# Patient Record
Sex: FEMALE | Race: WHITE | NOT HISPANIC OR LATINO | Employment: FULL TIME | URBAN - METROPOLITAN AREA
[De-identification: names, ages, dates, MRNs, and addresses within clinical notes are randomized per-mention and may not be internally consistent; named-entity substitution may affect disease eponyms.]

---

## 2024-05-29 DIAGNOSIS — L70.0 ACNE COMEDONE: ICD-10-CM

## 2024-05-29 RX ORDER — SPIRONOLACTONE 25 MG/1
25 TABLET ORAL 2 TIMES DAILY
Qty: 60 TABLET | Refills: 0 | Status: SHIPPED | OUTPATIENT
Start: 2024-05-29

## 2024-05-30 ENCOUNTER — HOSPITAL ENCOUNTER (OUTPATIENT)
Dept: CT IMAGING | Facility: HOSPITAL | Age: 24
Discharge: HOME | End: 2024-05-30
Admitting: NURSE PRACTITIONER
Payer: COMMERCIAL

## 2024-05-30 DIAGNOSIS — R51.9 NEW ONSET OF HEADACHES: ICD-10-CM

## 2024-05-30 DIAGNOSIS — G43.719 INTRACTABLE CHRONIC MIGRAINE WITHOUT AURA AND WITHOUT STATUS MIGRAINOSUS: ICD-10-CM

## 2024-05-30 PROCEDURE — 25510000001 IOPAMIDOL PER 1 ML: Performed by: NURSE PRACTITIONER

## 2024-05-30 PROCEDURE — 70470 CT HEAD/BRAIN W/O & W/DYE: CPT

## 2024-05-30 RX ORDER — SUMATRIPTAN 50 MG/1
TABLET, FILM COATED ORAL
Qty: 9 TABLET | Refills: 0 | Status: SHIPPED | OUTPATIENT
Start: 2024-05-30

## 2024-05-30 RX ADMIN — IOPAMIDOL 125 ML: 755 INJECTION, SOLUTION INTRAVENOUS at 13:46

## 2024-06-05 DIAGNOSIS — G43.719 INTRACTABLE CHRONIC MIGRAINE WITHOUT AURA AND WITHOUT STATUS MIGRAINOSUS: ICD-10-CM

## 2024-06-05 DIAGNOSIS — L70.0 ACNE COMEDONE: ICD-10-CM

## 2024-06-05 RX ORDER — SPIRONOLACTONE 25 MG/1
25 TABLET ORAL 2 TIMES DAILY
Qty: 60 TABLET | Refills: 0 | Status: SHIPPED | OUTPATIENT
Start: 2024-06-05

## 2024-06-05 RX ORDER — TOPIRAMATE 25 MG/1
25 TABLET ORAL DAILY
Qty: 30 TABLET | Refills: 1 | Status: SHIPPED | OUTPATIENT
Start: 2024-06-05

## 2024-06-17 NOTE — PROGRESS NOTES
"Well Woman Visit    CC: Scheduled annual well gyn visit  Chief Complaint   Patient presents with    Gynecologic Exam       HPI:   23 y.o.   Social History     Substance and Sexual Activity   Sexual Activity Yes    Partners: Male    Birth control/protection: None     Menses:   q mo, lasts 5-6 days, changes products q 3hrs on heaviest days.   Pain with menses:  Mild, OTC meds control discomfort    Patient is 25 yo or younger and DESIRES GC/CT testing today    Pt has no complaints today.    History: PMHx, Meds, Allergies, PSHx, Social Hx, and POBHx all reviewed and updated.    PHYSICAL EXAM:  /74   Pulse 82   Ht 157.5 cm (62\")   Wt 65.8 kg (145 lb)   LMP 2024   Breastfeeding No   BMI 26.52 kg/m²  Not found.   General- NAD, alert and oriented, appropriate  Psych- Normal mood, good memory  Neck- No masses, no thyroid enlargement  CV- Regular rhythm, no murmurs  Resp- CTA to bases, no wheezes  Abdomen- Soft, non distended, non tender, no masses    Chaperone present during breast and/or pelvic exam if performed.  Breast left-  Bilaterally symmetrical, no masses, non tender, no nipple discharge, no axillary or supraclavicular nodes palpable.    Breast right- Bilaterally symmetrical, no masses, non tender, no nipple discharge, no axillary or supraclavicular nodes palpable.      External genitalia- Normal female, no lesions  Urethra/meatus- Normal, no masses, non tender  Bladder- Normal, no masses, non tender  Vagina- Normal, no atrophy, no lesions, no discharge.    Prolapse : none noted, not examined with split speculum to delineate  Cvx- Normal, no lesions, no discharge, No cervical motion tenderness  Uterus- Normal size, shape & consistency.  Non tender, mobile.  Adnexa- No mass, non tender  Anus/Rectum/Perineum- Not performed    Lymphatic- No palpable neck, axillary, or groin nodes  Ext- No edema, no cyanosis    Skin- No lesions, no rashes, no acanthosis nigricans      ASSESSMENT and PLAN:  "   Diagnoses and all orders for this visit:    1. Well woman exam (Primary)  -     PAP, Liquid Based ([facility] Only)    2. Birth control counseling  Comments:  declines    3. Screen for STD (sexually transmitted disease)  -     Chlamydia trachomatis, Neisseria gonorrhoeae, PCR - Swab, Cervix        Preventative:  BREAST HEALTH- Monthly self breast exam importance and how to reviewed. MMG and/or MRI (prn) reviewed per society guidelines and her individual history. Screen: Not medically needed  CERVICAL CANCER Screening- Reviewed current ASCCP guidelines for screening w and wo cotest HPV, age specific.  Screen: Updated today  COLON CANCER Screening- Reviewed current medical society guidelines and options.  Screen:  Not medically needed  SEXUAL HEALTH: STD screening desired.  Ordered  Importance of WEIGHT MANAGEMENT, nutrition, and exercise reviewed.  Ideal BMI discussed  BONE HEALTH- Reviewed current medical society guidelines and options for testing, calcium and vit D intake.  Weight bearing exercise.  DEXA: Not medically needed  VACCINATIONS Recommended: Covid vaccine, Flu annually.  Importance discussed, risk being unvaccinated reviewed.  Questions answered  Smoking status- NON SMOKER/VAPER  Follow up PCP/Specialist PMHx and/or Labs          She understands the importance of having any ordered tests to be performed in a timely fashion.  The risks of not performing them include, but are not limited to, advanced cancer stages, bone loss from osteoporosis and/or subsequent increase in morbidity and/or mortality.  She is encouraged to review her results online and/or contact or office if she has questions.     Follow Up:  Return in about 1 year (around 6/21/2025) for WWE.            Anabel Blakely, DO  06/21/2024    [facility]  [facility] [place] [address]  Dept: [phone]  Dept Fax: [phone]  Loc: [phone]  Loc Fax: [phone]

## 2024-06-21 ENCOUNTER — OFFICE VISIT (OUTPATIENT)
Dept: OBSTETRICS AND GYNECOLOGY | Facility: CLINIC | Age: 24
End: 2024-06-21
Payer: COMMERCIAL

## 2024-06-21 VITALS
HEIGHT: 62 IN | SYSTOLIC BLOOD PRESSURE: 127 MMHG | HEART RATE: 82 BPM | BODY MASS INDEX: 26.68 KG/M2 | DIASTOLIC BLOOD PRESSURE: 74 MMHG | WEIGHT: 145 LBS

## 2024-06-21 DIAGNOSIS — Z30.09 BIRTH CONTROL COUNSELING: ICD-10-CM

## 2024-06-21 DIAGNOSIS — Z01.419 WELL WOMAN EXAM: Primary | ICD-10-CM

## 2024-06-21 DIAGNOSIS — Z11.3 SCREEN FOR STD (SEXUALLY TRANSMITTED DISEASE): ICD-10-CM

## 2024-06-21 LAB
C TRACH RRNA CVX QL NAA+PROBE: NOT DETECTED
N GONORRHOEA RRNA SPEC QL NAA+PROBE: NOT DETECTED

## 2024-06-21 PROCEDURE — 87491 CHLMYD TRACH DNA AMP PROBE: CPT | Performed by: OBSTETRICS & GYNECOLOGY

## 2024-06-21 PROCEDURE — 87591 N.GONORRHOEAE DNA AMP PROB: CPT | Performed by: OBSTETRICS & GYNECOLOGY

## 2024-06-21 PROCEDURE — G0123 SCREEN CERV/VAG THIN LAYER: HCPCS | Performed by: OBSTETRICS & GYNECOLOGY

## 2024-06-25 LAB
CYTOLOGIST CVX/VAG CYTO: NORMAL
CYTOLOGY CVX/VAG DOC CYTO: NORMAL
DX ICD CODE: NORMAL
Lab: NORMAL
OTHER STN SPEC: NORMAL
STAT OF ADQ CVX/VAG CYTO-IMP: NORMAL

## 2024-08-22 ENCOUNTER — OFFICE VISIT (OUTPATIENT)
Dept: FAMILY MEDICINE CLINIC | Facility: CLINIC | Age: 24
End: 2024-08-22
Payer: COMMERCIAL

## 2024-08-22 VITALS
WEIGHT: 143.8 LBS | DIASTOLIC BLOOD PRESSURE: 62 MMHG | HEIGHT: 62 IN | SYSTOLIC BLOOD PRESSURE: 106 MMHG | HEART RATE: 73 BPM | BODY MASS INDEX: 26.46 KG/M2

## 2024-08-22 DIAGNOSIS — L70.0 ACNE COMEDONE: ICD-10-CM

## 2024-08-22 DIAGNOSIS — G43.719 INTRACTABLE CHRONIC MIGRAINE WITHOUT AURA AND WITHOUT STATUS MIGRAINOSUS: Primary | ICD-10-CM

## 2024-08-22 PROCEDURE — 99214 OFFICE O/P EST MOD 30 MIN: CPT

## 2024-08-22 RX ORDER — RIMEGEPANT SULFATE 75 MG/75MG
75 TABLET, ORALLY DISINTEGRATING ORAL DAILY
Qty: 2 TABLET | Refills: 0 | COMMUNITY
Start: 2024-08-22

## 2024-08-22 RX ORDER — SPIRONOLACTONE 25 MG/1
25 TABLET ORAL 2 TIMES DAILY
Qty: 180 TABLET | Refills: 1 | Status: SHIPPED | OUTPATIENT
Start: 2024-08-22

## 2024-08-22 RX ORDER — RIMEGEPANT SULFATE 75 MG/75MG
75 TABLET, ORALLY DISINTEGRATING ORAL DAILY
Qty: 30 TABLET | Refills: 0 | Status: SHIPPED | OUTPATIENT
Start: 2024-08-22

## 2024-08-22 NOTE — PROGRESS NOTES
"Chief Complaint  Migraine and Acne    SUBJECTIVE  Ralph Arriaga presents to [facility]    History of Present Illness  Patient is a 24-year-old male presents today for 6-month follow-up on acne and migraines.    Acne-patient currently spironolactone.  Patient has experienced great improvement in her acne since starting this.    Migraines/patient was started on Topamax insurance denied Qulipta needed her to try this first.  Patient is also been on Imitrex.  Patient reports Topamax and Imitrex did not provide any relief for migraines.  Patient reports she is having 2 migraines a day.    She is due to update her lipid panel.     No other concerns today.     Past Medical History:   Diagnosis Date   • H/O cold sores    • Migraine withOUT aura       Family History   Problem Relation Age of Onset   • Diabetes Paternal Grandfather    • Breast cancer Neg Hx    • Ovarian cancer Neg Hx    • Uterine cancer Neg Hx    • Cervical cancer Neg Hx    • Colon cancer Neg Hx    • Stomach cancer Neg Hx    • Skin cancer Neg Hx    • Malig Hyperthermia Neg Hx    • Clotting disorder Neg Hx    • Pulmonary embolism Neg Hx    • Coronary artery disease Neg Hx       Past Surgical History:   Procedure Laterality Date   • EAR TUBES     • WISDOM TOOTH EXTRACTION          Current Outpatient Medications:   •  spironolactone (Aldactone) 25 MG tablet, Take 1 tablet by mouth 2 (Two) Times a Day., Disp: 180 tablet, Rfl: 1  •  Rimegepant Sulfate (Nurtec) 75 MG tablet dispersible tablet, Take 1 tablet by mouth Daily., Disp: 30 tablet, Rfl: 0  •  Rimegepant Sulfate (Nurtec) 75 MG tablet dispersible tablet, Take 1 tablet by mouth Daily. Lot [id number] exp 05/26, Disp: 2 tablet, Rfl: 0    OBJECTIVE  Vital Signs:   /62   Pulse 73   Ht 157.5 cm (62.01\")   Wt 65.2 kg (143 lb 12.8 oz)   BMI 26.29 kg/m²    Estimated body mass index is 26.29 kg/m² as calculated from the following:    Height as of this encounter: 157.5 cm " "(62.01\").    Weight as of this encounter: 65.2 kg (143 lb 12.8 oz).     Wt Readings from Last 3 Encounters:   08/22/24 65.2 kg (143 lb 12.8 oz)   06/21/24 65.8 kg (145 lb)   04/18/24 65.9 kg (145 lb 3.2 oz)     BP Readings from Last 3 Encounters:   08/22/24 106/62   06/21/24 127/74   04/18/24 118/67       Physical Exam  Vitals reviewed.   Constitutional:       General: She is not in acute distress.     Appearance: She is not ill-appearing.   HENT:      Head: Normocephalic and atraumatic.   Eyes:      Conjunctiva/sclera: Conjunctivae normal.   Cardiovascular:      Rate and Rhythm: Normal rate and regular rhythm.      Heart sounds: Normal heart sounds.   Pulmonary:      Effort: Pulmonary effort is normal.      Breath sounds: Normal breath sounds.   Abdominal:      General: Abdomen is flat. Bowel sounds are normal. There is no distension.      Palpations: Abdomen is soft.      Tenderness: There is no abdominal tenderness.   Skin:     General: Skin is warm and dry.   Neurological:      Mental Status: She is alert and oriented to person, place, and time.   Psychiatric:         Mood and Affect: Mood normal.         Behavior: Behavior normal.         Thought Content: Thought content normal.         Judgment: Judgment normal.        Result Review    Common labs          3/29/2024    09:47   Common Labs   Glucose 96    BUN 11    Creatinine 0.88    Sodium 138    Potassium 4.1    Chloride 104    Calcium 10.8    Albumin 4.4    Total Bilirubin 0.4    Alkaline Phosphatase 83    AST (SGOT) 19    ALT (SGPT) 19    WBC 6.69    Hemoglobin 15.4    Hematocrit 46.3    Platelets 290    Total Cholesterol 232    Triglycerides 125    HDL Cholesterol 63    LDL Cholesterol  147        CT Head With & Without Contrast    Result Date: 5/31/2024  Impression: 1.  An acute intracranial abnormality is not appreciated.    Electronically Signed By-Paras Leal MD On:5/31/2024 10:30 AM          The above data has been reviewed by JUDY Castro " 08/22/2024 15:10 EDT.          Patient Care Team:  Denisse Lemus APRN as PCP - General (Nurse Practitioner)  Carmine Dolan MD as Consulting Physician (Obstetrics and Gynecology)            ASSESSMENT & PLAN    Diagnoses and all orders for this visit:    1. Intractable chronic migraine without aura and without status migrainosus (Primary)  Comments:  start nurtec daily for prevention  Orders:  -     Rimegepant Sulfate (Nurtec) 75 MG tablet dispersible tablet; Take 1 tablet by mouth Daily.  Dispense: 30 tablet; Refill: 0  -     Rimegepant Sulfate (Nurtec) 75 MG tablet dispersible tablet; Take 1 tablet by mouth Daily. Lot [id number] exp 05/26  Dispense: 2 tablet; Refill: 0    2. Acne comedone  Comments:  improving on spironolactone,continue  Orders:  -     spironolactone (Aldactone) 25 MG tablet; Take 1 tablet by mouth 2 (Two) Times a Day.  Dispense: 180 tablet; Refill: 1         Tobacco Use: Low Risk  (8/22/2024)    Patient History    • Smoking Tobacco Use: Never    • Smokeless Tobacco Use: Never    • Passive Exposure: Never       Follow Up     Return in about 1 month (around 9/22/2024) for migraines.      Patient was given instructions and counseling regarding her condition or for health maintenance advice. Please see specific information pulled into the AVS if appropriate.   I have reviewed information obtained and documented by others and I have confirmed the accuracy of this documented note.    JUDY Castro

## 2024-11-22 ENCOUNTER — OFFICE VISIT (OUTPATIENT)
Dept: FAMILY MEDICINE CLINIC | Facility: CLINIC | Age: 24
End: 2024-11-22
Payer: COMMERCIAL

## 2024-11-22 ENCOUNTER — LAB (OUTPATIENT)
Dept: LAB | Facility: HOSPITAL | Age: 24
End: 2024-11-22
Payer: COMMERCIAL

## 2024-11-22 VITALS
HEIGHT: 62 IN | WEIGHT: 148.4 LBS | OXYGEN SATURATION: 100 % | SYSTOLIC BLOOD PRESSURE: 115 MMHG | HEART RATE: 80 BPM | BODY MASS INDEX: 27.31 KG/M2 | DIASTOLIC BLOOD PRESSURE: 67 MMHG

## 2024-11-22 DIAGNOSIS — R45.86 MOOD SWINGS: ICD-10-CM

## 2024-11-22 DIAGNOSIS — R45.4 IRRITABILITY AND ANGER: Primary | ICD-10-CM

## 2024-11-22 DIAGNOSIS — E83.52 HYPERCALCEMIA: ICD-10-CM

## 2024-11-22 DIAGNOSIS — E78.2 MODERATE MIXED HYPERLIPIDEMIA NOT REQUIRING STATIN THERAPY: ICD-10-CM

## 2024-11-22 DIAGNOSIS — G43.719 INTRACTABLE CHRONIC MIGRAINE WITHOUT AURA AND WITHOUT STATUS MIGRAINOSUS: ICD-10-CM

## 2024-11-22 LAB
AMPHET+METHAMPHET UR QL: NEGATIVE
AMPHETAMINE INTERNAL CONTROL: NORMAL
AMPHETAMINES UR QL: NEGATIVE
BARBITURATE INTERNAL CONTROL: NORMAL
BARBITURATES UR QL SCN: NEGATIVE
BENZODIAZ UR QL SCN: NEGATIVE
BENZODIAZEPINE INTERNAL CONTROL: NORMAL
BUPRENORPHINE INTERNAL CONTROL: NORMAL
BUPRENORPHINE SERPL-MCNC: NEGATIVE NG/ML
CA-I SERPL ISE-MCNC: 1.55 MMOL/L (ref 1.15–1.35)
CANNABINOIDS SERPL QL: NEGATIVE
CHOLEST SERPL-MCNC: 240 MG/DL (ref 0–200)
COCAINE INTERNAL CONTROL: NORMAL
COCAINE UR QL: NEGATIVE
EXPIRATION DATE: NORMAL
HDLC SERPL-MCNC: 58 MG/DL (ref 40–60)
LDLC SERPL CALC-MCNC: 162 MG/DL (ref 0–100)
LDLC/HDLC SERPL: 2.74 {RATIO}
Lab: NORMAL
MDMA (ECSTASY) INTERNAL CONTROL: NORMAL
MDMA UR QL SCN: NEGATIVE
METHADONE INTERNAL CONTROL: NORMAL
METHADONE UR QL SCN: NEGATIVE
METHAMPHETAMINE INTERNAL CONTROL: NORMAL
MORPHINE INTERNAL CONTROL: NORMAL
MORPHINE/OPIATES SCREEN, URINE: NEGATIVE
OXYCODONE INTERNAL CONTROL: NORMAL
OXYCODONE UR QL SCN: NEGATIVE
PCP UR QL SCN: NEGATIVE
PHENCYCLIDINE INTERNAL CONTROL: NORMAL
PTH-INTACT SERPL-MCNC: 25.3 PG/ML (ref 15–65)
THC INTERNAL CONTROL: NORMAL
TRIGL SERPL-MCNC: 115 MG/DL (ref 0–150)
VLDLC SERPL-MCNC: 20 MG/DL (ref 5–40)

## 2024-11-22 PROCEDURE — 83970 ASSAY OF PARATHORMONE: CPT

## 2024-11-22 PROCEDURE — 99214 OFFICE O/P EST MOD 30 MIN: CPT

## 2024-11-22 PROCEDURE — 80305 DRUG TEST PRSMV DIR OPT OBS: CPT

## 2024-11-22 PROCEDURE — 36415 COLL VENOUS BLD VENIPUNCTURE: CPT

## 2024-11-22 PROCEDURE — 80061 LIPID PANEL: CPT

## 2024-11-22 PROCEDURE — 82330 ASSAY OF CALCIUM: CPT

## 2024-11-22 RX ORDER — BUPROPION HYDROCHLORIDE 150 MG/1
150 TABLET ORAL DAILY
Qty: 30 TABLET | Refills: 1 | Status: SHIPPED | OUTPATIENT
Start: 2024-11-22

## 2024-11-22 RX ORDER — BUTALBITAL, ACETAMINOPHEN AND CAFFEINE 300; 40; 50 MG/1; MG/1; MG/1
1 CAPSULE ORAL EVERY 6 HOURS PRN
Qty: 30 CAPSULE | Refills: 0 | Status: SHIPPED | OUTPATIENT
Start: 2024-11-22

## 2024-11-22 NOTE — PROGRESS NOTES
Chief Complaint  Headache and mood swings    SUBJECTIVE  Ralph Arriaga presents to [facility] following up on headaches and to discuss mood swings    History of Present Illness  Headaches/migraines-patient reports she still having today.  Patient has been on Topamax, Imitrex, Nurtec, Qulipta in the past with no relief.  Patient is taking ibuprofen twice daily.  Mother accompanies her today and reports that only thing that worked for her was Fioricet and is adamant that we trial this.  Patient reports sometimes unilateral sometimes bilateral.  She does have sensitivity to light and sound.  Denies any aura.    Patient reports since having her daughter she has noticed mood swings irritability and anger.  Patient reports 1 minute she will be fine in the next she is having an episode of rage.  Mother reports she had issues with this in the past as well and after parathyroid removal improved.  Patient has had elevated calcium in the past.  Patient still has pending labs from May to repeat calcium,PTH and lipid panel. She would like to discuss medication options as this has started to effect her daily life. She was fired from previous job due to cussing at her boss. She is worried about possibility of weight gain and sexual dysfunction with medication.     Past Medical History:   Diagnosis Date    H/O cold sores     Migraine withOUT aura       Family History   Problem Relation Age of Onset    Diabetes Paternal Grandfather     Breast cancer Neg Hx     Ovarian cancer Neg Hx     Uterine cancer Neg Hx     Cervical cancer Neg Hx     Colon cancer Neg Hx     Stomach cancer Neg Hx     Skin cancer Neg Hx     Malig Hyperthermia Neg Hx     Clotting disorder Neg Hx     Pulmonary embolism Neg Hx     Coronary artery disease Neg Hx       Past Surgical History:   Procedure Laterality Date    EAR TUBES      WISDOM TOOTH EXTRACTION          Current Outpatient Medications:     spironolactone  "(Aldactone) 25 MG tablet, Take 1 tablet by mouth 2 (Two) Times a Day., Disp: 180 tablet, Rfl: 1    buPROPion XL (Wellbutrin XL) 150 MG 24 hr tablet, Take 1 tablet by mouth Daily., Disp: 30 tablet, Rfl: 1    butalbital-acetaminophen-caffeine (ORBIVAN) -40 MG capsule capsule, Take 1 capsule by mouth Every 6 (Six) Hours As Needed (migraine)., Disp: 30 capsule, Rfl: 0    OBJECTIVE  Vital Signs:   /67   Pulse 80   Ht 157.5 cm (62.01\")   Wt 67.3 kg (148 lb 6.4 oz)   SpO2 100%   BMI 27.13 kg/m²    Estimated body mass index is 27.13 kg/m² as calculated from the following:    Height as of this encounter: 157.5 cm (62.01\").    Weight as of this encounter: 67.3 kg (148 lb 6.4 oz).     Wt Readings from Last 3 Encounters:   11/22/24 67.3 kg (148 lb 6.4 oz)   08/22/24 65.2 kg (143 lb 12.8 oz)   06/21/24 65.8 kg (145 lb)     BP Readings from Last 3 Encounters:   11/22/24 115/67   08/22/24 106/62   06/21/24 127/74       Physical Exam  Vitals reviewed.   Constitutional:       General: She is not in acute distress.     Appearance: She is not ill-appearing.   HENT:      Head: Normocephalic and atraumatic.   Eyes:      Extraocular Movements: Extraocular movements intact.      Conjunctiva/sclera: Conjunctivae normal.      Pupils: Pupils are equal, round, and reactive to light.   Neck:      Thyroid: No thyroid mass, thyromegaly or thyroid tenderness.   Cardiovascular:      Rate and Rhythm: Normal rate and regular rhythm.      Heart sounds: Normal heart sounds.   Pulmonary:      Effort: Pulmonary effort is normal.      Breath sounds: Normal breath sounds.   Musculoskeletal:      Cervical back: Normal range of motion and neck supple.   Neurological:      General: No focal deficit present.      Mental Status: She is alert and oriented to person, place, and time.   Psychiatric:         Mood and Affect: Mood normal.         Behavior: Behavior normal.         Thought Content: Thought content normal.         Judgment: Judgment " normal.          Result Review    Common labs          3/29/2024    09:47   Common Labs   Glucose 96    BUN 11    Creatinine 0.88    Sodium 138    Potassium 4.1    Chloride 104    Calcium 10.8    Albumin 4.4    Total Bilirubin 0.4    Alkaline Phosphatase 83    AST (SGOT) 19    ALT (SGPT) 19    WBC 6.69    Hemoglobin 15.4    Hematocrit 46.3    Platelets 290    Total Cholesterol 232    Triglycerides 125    HDL Cholesterol 63    LDL Cholesterol  147        No Images in the past 120 days found..      The above data has been reviewed by JUDY Castro 11/22/2024 13:09 EST.          Patient Care Team:  Denisse Lemus APRN as PCP - General (Nurse Practitioner)  Carmine Dolan MD as Consulting Physician (Obstetrics and Gynecology)            ASSESSMENT & PLAN    Diagnoses and all orders for this visit:    1. Irritability and anger (Primary)  Comments:  trial 1month of wellbutrin 150 mg XL, if no better will stop and try Prozac  Orders:  -     buPROPion XL (Wellbutrin XL) 150 MG 24 hr tablet; Take 1 tablet by mouth Daily.  Dispense: 30 tablet; Refill: 1    2. Mood swings  Comments:  trial wellbutrin, 1 month follow up  Orders:  -     buPROPion XL (Wellbutrin XL) 150 MG 24 hr tablet; Take 1 tablet by mouth Daily.  Dispense: 30 tablet; Refill: 1    3. Intractable chronic migraine without aura and without status migrainosus  Comments:  trial celena UDS and CC done in office today, warned of overuse and rebound headaches  Orders:  -     POC 12 Panel Urine Drug Screen  -     butalbital-acetaminophen-caffeine (ORBIVAN) -40 MG capsule capsule; Take 1 capsule by mouth Every 6 (Six) Hours As Needed (migraine).  Dispense: 30 capsule; Refill: 0         Tobacco Use: Low Risk  (11/22/2024)    Patient History     Smoking Tobacco Use: Never     Smokeless Tobacco Use: Never     Passive Exposure: Never       Follow Up     Return in about 1 month (around 12/22/2024) for Next scheduled follow up.      Patient was given  instructions and counseling regarding her condition or for health maintenance advice. Please see specific information pulled into the AVS if appropriate.   I have reviewed information obtained and documented by others and I have confirmed the accuracy of this documented note.    JUDY Castro

## 2024-11-25 ENCOUNTER — TELEPHONE (OUTPATIENT)
Dept: FAMILY MEDICINE CLINIC | Facility: CLINIC | Age: 24
End: 2024-11-25

## 2024-11-25 NOTE — TELEPHONE ENCOUNTER
Caller: Ralph Arriaga    Relationship to patient: Self    Best call back number: [phone]    Patient is needing: PATIENT CALLED STATING [facility] HAS INFORMED HER THEY DID NOT RECEIVE THE PRESCRIPTION ORBIVAN THAT WAS SENT ON 11.22.24. PATIENT STATES SHE WAS TOLD BY THE PHARMACY TO REACH OUT TO HER PCP AND ASK IF THIS CAN BE RESENT. PATIENT WOULD LIKE A CALLBACK ONCE THIS HAS BEEN COMPLETED.

## 2024-11-27 ENCOUNTER — TELEPHONE (OUTPATIENT)
Dept: FAMILY MEDICINE CLINIC | Facility: CLINIC | Age: 24
End: 2024-11-27
Payer: COMMERCIAL

## 2024-11-27 DIAGNOSIS — E83.52 HYPERCALCEMIA: Primary | ICD-10-CM

## 2024-11-27 NOTE — TELEPHONE ENCOUNTER
[facility] CALLED OFFICE REQUESTING TO SPEAK AN MA REGARDING RESULTS. CALL BACK NUMBER [phone].

## 2024-12-19 DIAGNOSIS — R45.86 MOOD SWINGS: ICD-10-CM

## 2024-12-19 DIAGNOSIS — R45.4 IRRITABILITY AND ANGER: ICD-10-CM

## 2024-12-19 RX ORDER — BUPROPION HYDROCHLORIDE 150 MG/1
150 TABLET ORAL DAILY
Qty: 30 TABLET | Refills: 0 | Status: SHIPPED | OUTPATIENT
Start: 2024-12-19

## 2025-04-04 DIAGNOSIS — L70.0 ACNE COMEDONE: ICD-10-CM

## 2025-04-04 RX ORDER — SPIRONOLACTONE 25 MG/1
25 TABLET ORAL 2 TIMES DAILY
Qty: 80 TABLET | Refills: 0 | Status: SHIPPED | OUTPATIENT
Start: 2025-04-04

## 2025-04-17 ENCOUNTER — OFFICE VISIT (OUTPATIENT)
Dept: FAMILY MEDICINE CLINIC | Facility: CLINIC | Age: 25
End: 2025-04-17
Payer: COMMERCIAL

## 2025-04-17 VITALS
OXYGEN SATURATION: 99 % | HEIGHT: 62 IN | BODY MASS INDEX: 27.6 KG/M2 | SYSTOLIC BLOOD PRESSURE: 112 MMHG | HEART RATE: 67 BPM | WEIGHT: 150 LBS | DIASTOLIC BLOOD PRESSURE: 67 MMHG

## 2025-04-17 DIAGNOSIS — E66.3 OVERWEIGHT (BMI 25.0-29.9): ICD-10-CM

## 2025-04-17 DIAGNOSIS — Z00.00 ANNUAL PHYSICAL EXAM: Primary | ICD-10-CM

## 2025-04-17 DIAGNOSIS — R94.31 ABNORMAL EKG: ICD-10-CM

## 2025-04-17 DIAGNOSIS — Z79.899 HIGH RISK MEDICATION USE: ICD-10-CM

## 2025-04-17 DIAGNOSIS — Z13.29 SCREENING FOR THYROID DISORDER: ICD-10-CM

## 2025-04-17 DIAGNOSIS — Z13.220 SCREENING FOR LIPID DISORDERS: ICD-10-CM

## 2025-04-17 LAB
AMPHET+METHAMPHET UR QL: NEGATIVE
AMPHETAMINE INTERNAL CONTROL: NORMAL
AMPHETAMINES UR QL: NEGATIVE
BARBITURATE INTERNAL CONTROL: NORMAL
BARBITURATES UR QL SCN: NEGATIVE
BENZODIAZ UR QL SCN: NEGATIVE
BENZODIAZEPINE INTERNAL CONTROL: NORMAL
BUPRENORPHINE INTERNAL CONTROL: NORMAL
BUPRENORPHINE SERPL-MCNC: NEGATIVE NG/ML
CANNABINOIDS SERPL QL: NEGATIVE
COCAINE INTERNAL CONTROL: NORMAL
COCAINE UR QL: NEGATIVE
EXPIRATION DATE: NORMAL
Lab: NORMAL
MDMA (ECSTASY) INTERNAL CONTROL: NORMAL
MDMA UR QL SCN: NEGATIVE
METHADONE INTERNAL CONTROL: NORMAL
METHADONE UR QL SCN: NEGATIVE
METHAMPHETAMINE INTERNAL CONTROL: NORMAL
MORPHINE INTERNAL CONTROL: NORMAL
MORPHINE/OPIATES SCREEN, URINE: NEGATIVE
OXYCODONE INTERNAL CONTROL: NORMAL
OXYCODONE UR QL SCN: NEGATIVE
PCP UR QL SCN: NEGATIVE
PHENCYCLIDINE INTERNAL CONTROL: NORMAL
THC INTERNAL CONTROL: NORMAL

## 2025-04-17 RX ORDER — NAPROXEN 250 MG/1
250 TABLET ORAL 2 TIMES DAILY PRN
COMMUNITY

## 2025-04-17 RX ORDER — PHENTERMINE HYDROCHLORIDE 37.5 MG/1
37.5 CAPSULE ORAL EVERY MORNING
Qty: 30 CAPSULE | Refills: 0 | Status: CANCELLED | OUTPATIENT
Start: 2025-04-17

## 2025-04-17 NOTE — PROGRESS NOTES
Chief Complaint  Annual Exam    SUBJECTIVE  Ralph Arriaga presents to [facility]    History of Present Illness  Patient is a 24-year-old female who presents today for her annual physical exam, to be done in office today.  Needs chronic condition management of acne and migraines.    PAP- 6/21/24    Acne- Pt is currently on spironolactone 25 MG BID. Pt states that she is doing well on the medication. Has noticed marked improvement in her breakouts.     Migraines- Pt states that she has started taking naproxen sodium for onset migraines. She said that she thinks that she is taking about 1-2 a wk.    Stopped Wellbutrin for mood swings/irritability but state she feels great now, mood is stable.     She is concerned about her weight and wishes to start phentermine if she is able. She has been going to the gym and eating in calorie deficit and is gaining weight.     Patient is due labs. Orders placed at today's visit. Discussed with patient that these are fasting labs.     No other concerns or complaints at this time.  Patient denies any diarrhea, constipation, blood in stool, urinary urgency, frequency, or dysuria, chest pain, shortness of breath or syncope.             Past Medical History:   Diagnosis Date    H/O cold sores     Migraine withOUT aura       Family History   Problem Relation Age of Onset    Diabetes Paternal Grandfather     Breast cancer Neg Hx     Ovarian cancer Neg Hx     Uterine cancer Neg Hx     Cervical cancer Neg Hx     Colon cancer Neg Hx     Stomach cancer Neg Hx     Skin cancer Neg Hx     Malig Hyperthermia Neg Hx     Clotting disorder Neg Hx     Pulmonary embolism Neg Hx     Coronary artery disease Neg Hx       Past Surgical History:   Procedure Laterality Date    EAR TUBES      WISDOM TOOTH EXTRACTION          Current Outpatient Medications:     naproxen (NAPROSYN) 250 MG tablet, Take 1 tablet by mouth 2 (Two) Times a Day As Needed for Headache. Taking  "for onset of migraine, Disp: , Rfl:     spironolactone (Aldactone) 25 MG tablet, Take 1 tablet by mouth 2 (Two) Times a Day., Disp: 80 tablet, Rfl: 0    OBJECTIVE  Vital Signs:   /67 (BP Location: Left arm, Patient Position: Sitting, Cuff Size: Adult)   Pulse 67   Ht 157.5 cm (62\")   Wt 68 kg (150 lb)   LMP 04/11/2025   SpO2 99%   BMI 27.44 kg/m²    Estimated body mass index is 27.44 kg/m² as calculated from the following:    Height as of this encounter: 157.5 cm (62\").    Weight as of this encounter: 68 kg (150 lb).     Wt Readings from Last 3 Encounters:   04/17/25 68 kg (150 lb)   11/22/24 67.3 kg (148 lb 6.4 oz)   08/22/24 65.2 kg (143 lb 12.8 oz)     BP Readings from Last 3 Encounters:   04/17/25 112/67   11/22/24 115/67   08/22/24 106/62       Physical Exam  Vitals reviewed.   Constitutional:       General: She is awake. She is not in acute distress.     Appearance: She is well-groomed and overweight. She is not ill-appearing.   HENT:      Head: Normocephalic and atraumatic.      Right Ear: Tympanic membrane, ear canal and external ear normal.      Left Ear: Tympanic membrane, ear canal and external ear normal.      Nose: Nose normal.      Mouth/Throat:      Mouth: Mucous membranes are moist.      Pharynx: Oropharynx is clear. No oropharyngeal exudate or posterior oropharyngeal erythema.   Eyes:      Extraocular Movements: Extraocular movements intact.      Conjunctiva/sclera: Conjunctivae normal.      Pupils: Pupils are equal, round, and reactive to light.   Neck:      Thyroid: No thyroid mass, thyromegaly or thyroid tenderness.   Cardiovascular:      Rate and Rhythm: Normal rate and regular rhythm.      Heart sounds: Normal heart sounds.   Pulmonary:      Effort: Pulmonary effort is normal.      Breath sounds: Normal breath sounds.   Abdominal:      General: Abdomen is flat. Bowel sounds are normal. There is no distension.      Palpations: Abdomen is soft.      Tenderness: There is no abdominal " tenderness.   Musculoskeletal:         General: Normal range of motion.      Cervical back: Normal range of motion and neck supple. No rigidity or tenderness.   Lymphadenopathy:      Cervical: No cervical adenopathy.   Skin:     General: Skin is warm and dry.   Neurological:      Mental Status: She is alert and oriented to person, place, and time.   Psychiatric:         Mood and Affect: Mood normal.         Behavior: Behavior normal. Behavior is cooperative.         Thought Content: Thought content normal.         Judgment: Judgment normal.          Result Review    Common labs          11/22/2024    13:58   Common Labs   Total Cholesterol 240    Triglycerides 115    HDL Cholesterol 58    LDL Cholesterol  162        No Images in the past 120 days found..      The above data has been reviewed by JUDY Castro 04/17/2025 15:40 EDT.      ECG 12 Lead    Date/Time: 4/17/2025 4:27 PM  Performed by: Denisse Lemus APRN    Authorized by: Denisse Lemus APRN  Previous ECG: no previous ECG available  Rhythm: sinus rhythm  Rate: normal  T Waves: T waves normal  QRS axis: normal  Other findings: non-specific ST-T wave changes    Clinical impression: abnormal EKG           Patient Care Team:  Denisse Lemus APRN as PCP - General (Nurse Practitioner)  Carmine Dolan MD as Consulting Physician (Obstetrics and Gynecology)            ASSESSMENT & PLAN    Diagnoses and all orders for this visit:    1. Annual physical exam (Primary)  Comments:  Preventative counseling  Healthy diet  Daily exercise  Get adequate sleep  Orders:  -     Comprehensive Metabolic Panel; Future  -     CBC & Differential; Future  -     Lipid Panel; Future  -     TSH+Free T4; Future    2. Screening for thyroid disorder  -     TSH+Free T4; Future    3. Screening for lipid disorders  -     Lipid Panel; Future    4. High risk medication use  Comments:  UDS and drug contract signed in office today, JUDEP/Michael reviewed and appropriate  Orders:  -      Cancel: ECG 12 Lead  -     POC 12 Panel Urine Drug Screen  -     ECG 12 Lead    5. Overweight (BMI 25.0-29.9)  Comments:  UDS normal. CC signed, due to abnormal EKG will withhold at this time.  Orders:  -     POC 12 Panel Urine Drug Screen    6. Abnormal EKG  Comments:  benign vs cardiac vs electorlyte imbalance vs machine error, assess labs for electrolyte imbalance then will determine need for further evaluation.         Tobacco Use: Low Risk  (4/17/2025)    Patient History     Smoking Tobacco Use: Never     Smokeless Tobacco Use: Never     Passive Exposure: Never       Follow Up     Return in about 6 months (around 10/17/2025), or if symptoms worsen or fail to improve, for Next scheduled follow up.      Patient was given instructions and counseling regarding her condition or for health maintenance advice. Please see specific information pulled into the AVS if appropriate.   I have reviewed information obtained and documented by others and I have confirmed the accuracy of this documented note.    JUDY Castro

## 2025-05-16 ENCOUNTER — OFFICE VISIT (OUTPATIENT)
Dept: FAMILY MEDICINE CLINIC | Facility: CLINIC | Age: 25
End: 2025-05-16
Payer: COMMERCIAL

## 2025-05-16 VITALS
OXYGEN SATURATION: 97 % | DIASTOLIC BLOOD PRESSURE: 64 MMHG | SYSTOLIC BLOOD PRESSURE: 113 MMHG | HEART RATE: 77 BPM | WEIGHT: 148.8 LBS | HEIGHT: 62 IN | BODY MASS INDEX: 27.38 KG/M2

## 2025-05-16 DIAGNOSIS — J06.9 VIRAL URI WITH COUGH: Primary | ICD-10-CM

## 2025-05-16 PROCEDURE — 99213 OFFICE O/P EST LOW 20 MIN: CPT

## 2025-05-16 RX ORDER — BROMPHENIRAMINE MALEATE, PSEUDOEPHEDRINE HYDROCHLORIDE, AND DEXTROMETHORPHAN HYDROBROMIDE 2; 30; 10 MG/5ML; MG/5ML; MG/5ML
10 SYRUP ORAL 4 TIMES DAILY PRN
Qty: 473 ML | Refills: 0 | Status: SHIPPED | OUTPATIENT
Start: 2025-05-16

## 2025-05-16 NOTE — PROGRESS NOTES
"Chief Complaint  Cough    SUBJECTIVE  Ralph Arriaga presents to [facility]    History of Present Illness  Patient is 24-year-old female who presents today for cough, nasal congestion for 1 week. Has only taken mucinex DM OTC. Cough is productive in the Am and persists throughout the day, mostly dry. No fever, body aches. States she tasted for strep last week at work due to sore throat and was negative. She has not been taking her allergy medication.         Past Medical History:   Diagnosis Date    H/O cold sores     Migraine withOUT aura       Family History   Problem Relation Age of Onset    Diabetes Paternal Grandfather     Breast cancer Neg Hx     Ovarian cancer Neg Hx     Uterine cancer Neg Hx     Cervical cancer Neg Hx     Colon cancer Neg Hx     Stomach cancer Neg Hx     Skin cancer Neg Hx     Malig Hyperthermia Neg Hx     Clotting disorder Neg Hx     Pulmonary embolism Neg Hx     Coronary artery disease Neg Hx       Past Surgical History:   Procedure Laterality Date    EAR TUBES      WISDOM TOOTH EXTRACTION          Current Outpatient Medications:     naproxen (NAPROSYN) 250 MG tablet, Take 1 tablet by mouth 2 (Two) Times a Day As Needed for Headache. Taking for onset of migraine, Disp: , Rfl:     spironolactone (Aldactone) 25 MG tablet, Take 1 tablet by mouth 2 (Two) Times a Day., Disp: 80 tablet, Rfl: 0    brompheniramine-pseudoephedrine-DM 30-2-10 MG/5ML syrup, Take 10 mL by mouth 4 (Four) Times a Day As Needed for Congestion., Disp: 473 mL, Rfl: 0    OBJECTIVE  Vital Signs:   /64 (BP Location: Left arm, Patient Position: Sitting, Cuff Size: Adult)   Pulse 77   Ht 157.5 cm (62\")   Wt 67.5 kg (148 lb 12.8 oz)   LMP 04/11/2025   SpO2 97%   BMI 27.22 kg/m²    Estimated body mass index is 27.22 kg/m² as calculated from the following:    Height as of this encounter: 157.5 cm (62\").    Weight as of this encounter: 67.5 kg (148 lb 12.8 oz).     Wt Readings " from Last 3 Encounters:   05/16/25 67.5 kg (148 lb 12.8 oz)   04/17/25 68 kg (150 lb)   11/22/24 67.3 kg (148 lb 6.4 oz)     BP Readings from Last 3 Encounters:   05/16/25 113/64   04/17/25 112/67   11/22/24 115/67       Physical Exam  Vitals reviewed.   Constitutional:       General: She is not in acute distress.     Appearance: She is not ill-appearing.   HENT:      Head: Normocephalic and atraumatic.      Right Ear: Tympanic membrane, ear canal and external ear normal.      Left Ear: Tympanic membrane, ear canal and external ear normal.      Nose: Congestion and rhinorrhea present.      Mouth/Throat:      Mouth: Mucous membranes are moist.      Pharynx: Posterior oropharyngeal erythema and postnasal drip present. No oropharyngeal exudate.   Eyes:      Conjunctiva/sclera: Conjunctivae normal.   Cardiovascular:      Rate and Rhythm: Normal rate and regular rhythm.      Heart sounds: Normal heart sounds.   Pulmonary:      Effort: Pulmonary effort is normal.      Breath sounds: Normal breath sounds. No wheezing, rhonchi or rales.      Comments: Dry cough  Musculoskeletal:      Cervical back: Normal range of motion.   Neurological:      Mental Status: She is alert and oriented to person, place, and time.   Psychiatric:         Mood and Affect: Mood normal.         Behavior: Behavior normal.         Thought Content: Thought content normal.         Judgment: Judgment normal.          Result Review    Common labs          11/22/2024    13:58   Common Labs   Total Cholesterol 240    Triglycerides 115    HDL Cholesterol 58    LDL Cholesterol  162        No Images in the past 120 days found..      The above data has been reviewed by JUDY Castro 05/16/2025 15:24 EDT.          Patient Care Team:  Denisse Lemus APRN as PCP - General (Nurse Practitioner)  Carmine Dolan MD as Consulting Physician (Obstetrics and Gynecology)           ASSESSMENT & PLAN    Diagnoses and all orders for this visit:    1. Viral URI  with cough (Primary)  Comments:  start bromphed dm, increase fluids and rest, self limiting  Orders:  -     brompheniramine-pseudoephedrine-DM 30-2-10 MG/5ML syrup; Take 10 mL by mouth 4 (Four) Times a Day As Needed for Congestion.  Dispense: 473 mL; Refill: 0         Tobacco Use: Low Risk  (5/16/2025)    Patient History     Smoking Tobacco Use: Never     Smokeless Tobacco Use: Never     Passive Exposure: Never       Follow Up     Return if symptoms worsen or fail to improve.      Patient was given instructions and counseling regarding her condition or for health maintenance advice. Please see specific information pulled into the AVS if appropriate.   I have reviewed information obtained and documented by others and I have confirmed the accuracy of this documented note.    JUDY Castro

## 2025-08-07 ENCOUNTER — TELEPHONE (OUTPATIENT)
Dept: FAMILY MEDICINE CLINIC | Facility: CLINIC | Age: 25
End: 2025-08-07
Payer: COMMERCIAL

## 2025-08-07 DIAGNOSIS — B00.1 COLD SORE: Primary | ICD-10-CM

## 2025-08-07 RX ORDER — VALACYCLOVIR HYDROCHLORIDE 1 G/1
1000 TABLET, FILM COATED ORAL 2 TIMES DAILY
Qty: 14 TABLET | Refills: 3 | Status: SHIPPED | OUTPATIENT
Start: 2025-08-07 | End: 2025-08-14